# Patient Record
Sex: FEMALE | Race: BLACK OR AFRICAN AMERICAN
[De-identification: names, ages, dates, MRNs, and addresses within clinical notes are randomized per-mention and may not be internally consistent; named-entity substitution may affect disease eponyms.]

---

## 2018-09-28 ENCOUNTER — HOSPITAL ENCOUNTER (EMERGENCY)
Dept: HOSPITAL 68 - ERH | Age: 23
End: 2018-09-28
Payer: COMMERCIAL

## 2018-09-28 VITALS — DIASTOLIC BLOOD PRESSURE: 81 MMHG | SYSTOLIC BLOOD PRESSURE: 129 MMHG
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
## 2018-09-28 DIAGNOSIS — S80.11XA: Primary | ICD-10-CM
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
## 2018-09-28 NOTE — ED UPPER/LOWER EXTREMITY COMPL
History of Present Illness
 
General
Chief Complaint: Lower Extremity Injury
Stated Complaint: RT KNEE INJURY TRANSPORTING PT, WORK RELATED INJUR
Source: patient
Exam Limitations: no limitations
 
Vital Signs & Intake/Output
Vital Signs & Intake/Output
 Vital Signs
 
 
Date Time Temp Pulse Resp B/P B/P Pulse O2 O2 Flow FiO2
 
     Mean Ox Delivery Rate 
 
09/28 0244  80 20 129/81  99   
 
 
 
Allergies
Coded Allergies:
No Known Allergies (09/28/18)
 
Reconcile Medications
No Known Home Medications
 
Triage Note:
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
 HIT RT KNEE ON DOOR
 CO 4/10 PAIN
 ABLE TO WALK
 LMP AUGUST HAS IUD IN PLACE DENIES CHANCE OF
 PREGNANCY
Triage Nurses Notes Reviewed? yes
Onset: Abrupt
Duration: minute(s):, constant, continues in ED
Timing: single episode today
Severity: mild
Pregnant: No
Patient currently breastfeeds: No
HPI:
Patient presents for evaluation of right knee injury sustained just prior to 
arrival.  Patient states that she was on the job and transporting a patient in 
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  She has been 
ambulatory since. 
 
Past History
 
Travel History
Traveled to [place] past 21 day No
 
Medical History
Any Pertinent Medical History? see below for history
Neurological: NONE
EENT: NONE
Cardiovascular: NONE
Respiratory: NONE
Gastrointestinal: NONE
Hepatic: NONE
Renal: NONE
Musculoskeletal: NONE
Psychiatric: NONE
Endocrine: NONE
GYN/Reproductive: CULPOSCOPY 8/18
 
Surgical History
Surgical History: non-contributory
 
Psychosocial History
What is your primary language English
Tobacco Use: Never used
 
Family History
Hx Contributory? No
 
Review of Systems
 
Review of Systems
Constitutional:
Reports: no symptoms. 
EENTM:
Reports: no symptoms. 
Respiratory:
Reports: no symptoms. 
Cardiovascular:
Reports: no symptoms. 
Gastrointestinal/Abdominal:
Reports: no symptoms. 
Genitourinary:
Reports: no symptoms. 
Musculoskeletal:
Reports: see HPI. 
Skin:
Reports: no symptoms. 
Neurological/Psychological:
Reports: no symptoms. 
Hematologic/Endocrine:
Reports: no symptoms. 
Immunological:
Reports: no symptoms. 
All Other Systems: Reviewed and Negative
 
Physical Exam
 
Physical Exam
General Appearance: see below
Comments:
Gen.: Well-nourished, well-developed, no acute respiratory distress.
Head: Normocephalic, atraumatic.
Eyes: Normal inspection bilaterally
Ears: Normal inspection bilaterally
Nose: Normal inspection
Throat/mouth : Moist mucosa 
Neck: Supple, full range of motion, no goiter
Heart: Regular rate and rhythm
Lungs: Quiet respirations
Back: Normal range of motion
Extremities: Right lower extremity: Mild abrasion over the tibial tuberosity 
with mild tenderness.  The right lower extremity is neurovascularly intact 
distally.  Patient has good knee extension.  The right knee is stable.
Neurologic: Cranial nerves grossly intact, speech is clear
Skin: warm and dry
Psychiatric: Calm, cooperative, no apparent delusions or hallucinations
 
Diagram
Legs Front/Back
 
  1) small abrasion
 
Progress
Differential Diagnosis: contusion, dislocation, fracture, sprain, tendon injury
Plan of Care:
Nonsteroidal anti-inflammatory as needed.  Cool compresses.
 
Departure
 
Departure
Disposition: HOME OR SELF CARE
Condition: Stable
Clinical Impression
Primary Impression: Contusion of right leg
Qualifiers:  Encounter type: initial encounter Qualified Code: S80.11XA - 
Contusion of right lower leg, initial encounter
Referrals:
Patient Has No Primary Care Dr (PCP/Family)
 
Additional Instructions:
Cool compresses as needed for swelling.  Ibuprofen 600 mg every 6 hours as 
needed for pain.  He may return to work immediately.  Follow-up with your 
primary care physician if not improving over the next week to 10 days.  Return 
immediately if any concerns or sudden worsening.
 
If you do not currently have a primary care physician then please contact the 
[facility] at the following phone number: [phone].
 
Thank you for choosing the [facility] Emergency Department for your care.
It was a pleasure to serve you today.
 
Gary Villanueva M.D.
[facility]
Departure Forms:
Customer Survey
[employer] Employee Acc Report
General Discharge Information
Prescriptions:
Current Visit Scripts
No Known Home Medications